# Patient Record
Sex: MALE | Race: WHITE
[De-identification: names, ages, dates, MRNs, and addresses within clinical notes are randomized per-mention and may not be internally consistent; named-entity substitution may affect disease eponyms.]

---

## 2017-01-23 ENCOUNTER — HOSPITAL ENCOUNTER (OUTPATIENT)
Dept: HOSPITAL 21 - SOUO | Age: 37
Discharge: HOME | End: 2017-01-23
Attending: INTERNAL MEDICINE
Payer: COMMERCIAL

## 2017-01-23 VITALS
RESPIRATION RATE: 16 BRPM | DIASTOLIC BLOOD PRESSURE: 77 MMHG | HEART RATE: 61 BPM | OXYGEN SATURATION: 98 % | SYSTOLIC BLOOD PRESSURE: 105 MMHG

## 2017-01-23 VITALS — DIASTOLIC BLOOD PRESSURE: 69 MMHG | OXYGEN SATURATION: 97 % | SYSTOLIC BLOOD PRESSURE: 114 MMHG

## 2017-01-23 VITALS
RESPIRATION RATE: 24 BRPM | DIASTOLIC BLOOD PRESSURE: 80 MMHG | SYSTOLIC BLOOD PRESSURE: 106 MMHG | OXYGEN SATURATION: 100 % | HEART RATE: 64 BPM

## 2017-01-23 VITALS — DIASTOLIC BLOOD PRESSURE: 73 MMHG | SYSTOLIC BLOOD PRESSURE: 110 MMHG

## 2017-01-23 VITALS — WEIGHT: 138.89 LBS | BODY MASS INDEX: 21.8 KG/M2 | HEIGHT: 67 IN

## 2017-01-23 VITALS
OXYGEN SATURATION: 99 % | RESPIRATION RATE: 17 BRPM | DIASTOLIC BLOOD PRESSURE: 83 MMHG | HEART RATE: 66 BPM | SYSTOLIC BLOOD PRESSURE: 118 MMHG

## 2017-01-23 VITALS — SYSTOLIC BLOOD PRESSURE: 106 MMHG | DIASTOLIC BLOOD PRESSURE: 80 MMHG

## 2017-01-23 DIAGNOSIS — R55: Primary | ICD-10-CM

## 2017-01-23 LAB
BASOPHILS % (AUTO): 0.9 % (ref 0–3)
EOSINOPHILS % (AUTO): 2.4 % (ref 0–5)
INR: 0.95 RATIO
MCH RBC QN AUTO: 33.1 PG (ref 27–35)
MEAN CORPUSCULAR VOLUME: 94 FL (ref 81–100)
MONOCYTES % (AUTO): 9.9 % (ref 4–12)
NEUTROPHILS NFR BLD AUTO: 49.1 % (ref 40–74)
PLATELET COUNT: 203 BIL/L (ref 150–400)

## 2017-01-23 PROCEDURE — 85610 PROTHROMBIN TIME: CPT

## 2017-01-23 PROCEDURE — 33282: CPT

## 2017-01-23 PROCEDURE — 36415 COLL VENOUS BLD VENIPUNCTURE: CPT

## 2017-01-23 PROCEDURE — 85025 COMPLETE CBC W/AUTO DIFF WBC: CPT

## 2017-01-23 PROCEDURE — 93005 ELECTROCARDIOGRAM TRACING: CPT

## 2017-01-23 PROCEDURE — 80048 BASIC METABOLIC PNL TOTAL CA: CPT

## 2017-01-23 NOTE — OP
[address] [address]

 

                                OPERATIVE REPORT

 

PATIENT:  EDUIN CIFUENTES                                      : 1980

ACCOUNT#: [id number]                                            MR#: [id number]

ADMIT:    2017

JOB ID:   [id number]

 

 

DATE OF SURGERY:    2017

 

SURGEON:    Renay Marsh MD.

 

PREOPERATIVE DIAGNOSIS(ES):   Recurrent syncope.

 

POSTOPERATIVE DIAGNOSIS(ES):   Recurrent syncope.

 

PATIENT PROFILE:    The patient is a 36-year-old male who has recurrent syncope.

 

PROCEDURE:   Injectable loop recorder implantation.

 

COMPLICATION:   None.

 

IMPLANTED DEVICE:   Ze[employer] Reveal LINQ, model LNQ11, serial #[id number].

 

DESCRIPTION OF PROCEDURE:

The left parasternal area was prepped and draped under standard sterile

technique.  The left parasternal region was infiltrated with a 50/50% mixture

of lidocaine and Marcaine.  The skin incision was made with the provided

scalpel.  An injectable loop recorder was then implanted into the subcutaneous

tissue under the provided applicator.  After hemostasis was achieved, the skin

was approximated with Dermabond.  The patient tolerated the procedure well and

was transferred to his room in stable condition.  Vancomycin 1 g was given IV

due to concern of a small area of sterile field.

 

The device was set to detect bradycardia below 30 beats per minute, tachycardia

above 194 beats per minute, and pause over 3 seconds.  The sensitivity

threshold is 0.035 mV.

 

 

MTDD

## 2017-01-23 NOTE — NUR
ADMISSION NOTE

MALE PT ADMITTED FOR LOOP RECORDER. DISCUSSED PLAN OF CARE WITH PT. SEE ADMIT AND FLOW SHEET

## 2017-03-03 ENCOUNTER — HOSPITAL ENCOUNTER (OUTPATIENT)
Dept: HOSPITAL 21 - SOUO | Age: 37
LOS: 1 days | Discharge: HOME | End: 2017-03-04
Payer: COMMERCIAL

## 2017-03-03 VITALS
SYSTOLIC BLOOD PRESSURE: 128 MMHG | DIASTOLIC BLOOD PRESSURE: 78 MMHG | RESPIRATION RATE: 18 BRPM | OXYGEN SATURATION: 98 % | HEART RATE: 61 BPM

## 2017-03-03 VITALS — HEART RATE: 68 BPM | RESPIRATION RATE: 17 BRPM | SYSTOLIC BLOOD PRESSURE: 101 MMHG | DIASTOLIC BLOOD PRESSURE: 62 MMHG

## 2017-03-03 VITALS
SYSTOLIC BLOOD PRESSURE: 109 MMHG | DIASTOLIC BLOOD PRESSURE: 62 MMHG | RESPIRATION RATE: 13 BRPM | OXYGEN SATURATION: 96 % | HEART RATE: 79 BPM

## 2017-03-03 VITALS — DIASTOLIC BLOOD PRESSURE: 74 MMHG | RESPIRATION RATE: 19 BRPM | HEART RATE: 67 BPM | SYSTOLIC BLOOD PRESSURE: 126 MMHG

## 2017-03-03 VITALS
DIASTOLIC BLOOD PRESSURE: 66 MMHG | RESPIRATION RATE: 16 BRPM | HEART RATE: 71 BPM | OXYGEN SATURATION: 95 % | SYSTOLIC BLOOD PRESSURE: 109 MMHG

## 2017-03-03 VITALS
HEART RATE: 72 BPM | OXYGEN SATURATION: 95 % | RESPIRATION RATE: 14 BRPM | DIASTOLIC BLOOD PRESSURE: 71 MMHG | SYSTOLIC BLOOD PRESSURE: 109 MMHG

## 2017-03-03 VITALS — DIASTOLIC BLOOD PRESSURE: 65 MMHG | RESPIRATION RATE: 19 BRPM | HEART RATE: 67 BPM | SYSTOLIC BLOOD PRESSURE: 109 MMHG

## 2017-03-03 VITALS
RESPIRATION RATE: 16 BRPM | SYSTOLIC BLOOD PRESSURE: 110 MMHG | OXYGEN SATURATION: 95 % | HEART RATE: 70 BPM | DIASTOLIC BLOOD PRESSURE: 67 MMHG

## 2017-03-03 VITALS — BODY MASS INDEX: 21.66 KG/M2 | WEIGHT: 138.01 LBS | HEIGHT: 67 IN

## 2017-03-03 VITALS
RESPIRATION RATE: 18 BRPM | OXYGEN SATURATION: 96 % | DIASTOLIC BLOOD PRESSURE: 61 MMHG | SYSTOLIC BLOOD PRESSURE: 112 MMHG | HEART RATE: 77 BPM

## 2017-03-03 VITALS
SYSTOLIC BLOOD PRESSURE: 95 MMHG | HEART RATE: 70 BPM | RESPIRATION RATE: 17 BRPM | OXYGEN SATURATION: 96 % | DIASTOLIC BLOOD PRESSURE: 64 MMHG

## 2017-03-03 VITALS
DIASTOLIC BLOOD PRESSURE: 61 MMHG | SYSTOLIC BLOOD PRESSURE: 104 MMHG | HEART RATE: 72 BPM | OXYGEN SATURATION: 96 % | RESPIRATION RATE: 12 BRPM

## 2017-03-03 VITALS — HEART RATE: 80 BPM | SYSTOLIC BLOOD PRESSURE: 117 MMHG | DIASTOLIC BLOOD PRESSURE: 65 MMHG | RESPIRATION RATE: 20 BRPM

## 2017-03-03 VITALS — HEART RATE: 76 BPM

## 2017-03-03 DIAGNOSIS — I45.5: ICD-10-CM

## 2017-03-03 DIAGNOSIS — R55: ICD-10-CM

## 2017-03-03 DIAGNOSIS — I49.5: Primary | ICD-10-CM

## 2017-03-03 LAB
BASOPHILS % (AUTO): 0.6 % (ref 0–3)
EOSINOPHILS % (AUTO): 1.8 % (ref 0–5)
INR: 1 RATIO
MCH RBC QN AUTO: 32.4 PG (ref 27–35)
MEAN CORPUSCULAR VOLUME: 93 FL (ref 81–100)
MONOCYTES % (AUTO): 10.5 % (ref 4–12)
NEUTROPHILS NFR BLD AUTO: 57.9 % (ref 40–74)
PLATELET COUNT: 229 BIL/L (ref 150–400)

## 2017-03-03 PROCEDURE — 93005 ELECTROCARDIOGRAM TRACING: CPT

## 2017-03-03 PROCEDURE — 80048 BASIC METABOLIC PNL TOTAL CA: CPT

## 2017-03-03 PROCEDURE — 33208 INSRT HEART PM ATRIAL & VENT: CPT

## 2017-03-03 PROCEDURE — 36415 COLL VENOUS BLD VENIPUNCTURE: CPT

## 2017-03-03 PROCEDURE — 71010: CPT

## 2017-03-03 PROCEDURE — 85025 COMPLETE CBC W/AUTO DIFF WBC: CPT

## 2017-03-03 PROCEDURE — 33284: CPT

## 2017-03-03 PROCEDURE — 71020: CPT

## 2017-03-03 PROCEDURE — 85610 PROTHROMBIN TIME: CPT

## 2017-03-03 PROCEDURE — 99152 MOD SED SAME PHYS/QHP 5/>YRS: CPT
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
## 2017-03-03 NOTE — DRSVH
PROCEDURE:  X-RAY CHEST ONE VIEW, PORTABLE ([id number])

 

INDICATIONS:  For new leads placed

 

TECHNIQUE:  One view of the chest was acquired.  

 

COMPARISON:  None.

 

FINDINGS:  

 

Surgical changes and devices: Dual chamber left cardiac pacer present in expected position.

 

Lungs and pleura:  No pleural effusions or pneumothorax.  Lungs are clear.  

 

Mediastinum:  Mediastinal contours appear normal.  Heart size is normal.  

 

Bones and chest wall:  No suspicious bony lesions.  Overlying soft tissues appear unremarkable.  

 

IMPRESSION:  No immediate complications status post cardiac pacemaker placement.

 

 

 

Dictated by: Bhanu VALENTIN Interpreted: Ryann Tuttle MD on 3/03/2017 at 13:32   

Transcribed by: RENETTA on 3/03/2017 at 13:32    

Approved by: Ryann Tuttle M.D. on 3/03/2017 at 14:03

## 2017-03-03 NOTE — NUR
Report called to [facility] Left pacer site without hematoma, bleeding or oozing. Pt ready 
for transport, remains in sinus rhythm.
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
## 2017-03-04 VITALS
HEART RATE: 74 BPM | OXYGEN SATURATION: 96 % | RESPIRATION RATE: 18 BRPM | SYSTOLIC BLOOD PRESSURE: 207 MMHG | DIASTOLIC BLOOD PRESSURE: 78 MMHG

## 2017-03-04 VITALS
DIASTOLIC BLOOD PRESSURE: 68 MMHG | OXYGEN SATURATION: 97 % | RESPIRATION RATE: 18 BRPM | HEART RATE: 70 BPM | SYSTOLIC BLOOD PRESSURE: 118 MMHG

## 2017-03-04 VITALS
OXYGEN SATURATION: 97 % | DIASTOLIC BLOOD PRESSURE: 58 MMHG | SYSTOLIC BLOOD PRESSURE: 103 MMHG | HEART RATE: 68 BPM | RESPIRATION RATE: 18 BRPM

## 2017-03-04 VITALS — HEART RATE: 65 BPM

## 2017-03-04 VITALS
SYSTOLIC BLOOD PRESSURE: 122 MMHG | RESPIRATION RATE: 18 BRPM | OXYGEN SATURATION: 96 % | HEART RATE: 93 BPM | DIASTOLIC BLOOD PRESSURE: 67 MMHG

## 2017-03-04 VITALS — HEART RATE: 72 BPM

## 2017-03-04 NOTE — DRSVH
PROCEDURE:  X-RAY CHEST, TWO VIEWS ([id number])

 

INDICATIONS:  For new lead placement

 

TECHNIQUE:  2 views of the chest were acquired.  

 

COMPARISON:  [facility], CR, XR CHEST 1VW (PORTABLE), 3/03/2017, 11:40.

 

FINDINGS:  

 

Surgical changes and devices: Left-sided pacer.

 

Lungs and pleura:  No pleural effusions or pneumothorax.  Lungs are clear.  

 

Mediastinum:  Mediastinal contours are normal.  Heart size is normal.  

 

Bones and chest wall:  No suspicious bony abnormalities.  Soft tissues appear unremarkable.  

 

IMPRESSION: No acute process.

 

 

 

Dictated by: Stanislaw Abad M.D. on 3/04/2017 at 8:31     

Approved by: Stanislaw Abad M.D. on 3/04/2017 at 8:32

## 2017-03-04 NOTE — NUR
Discharge:



Patient discharged to home @ approx 1100.  IV x2 d/c'd intact, telemetry removed, monitor 
tech notified.  Personal belongings sent home w/patient.  Reviewed antibiotic prescription, 
home medication list, d/c instructions, activity restriction r/t pacemaker placement and 
follow up appointments.  Verbalized understanding.  Ambulated to main entrance.  No apparent 
distress noted at time of discharge.

## 2017-03-04 NOTE — NUR
Uneventful Night:

Pt rested throughout the night with no complaints of chest pain or discomfort. Denies SOB, 
n/v. Up walking around unit at beginning of shift. Wife at bedside. Call light within reach. 
Pleasant and cooperative with care.

## 2017-03-04 NOTE — DIS
[address] [address]

 

                                DISCHARGE SUMMARY

 

PATIENT:  EDUIN CIFUENTES                                      : 1980

ACCOUNT#: [id number]                                            MR#: [id number]

ADMIT:    2017                                          JOB ID:   [id number]

DIS:

 

 

REASON FOR ADMISSION:

Pacemaker implant.

 

CHIEF COMPLAINT:

Recurrent syncope.

 

BRIEF HISTORY:

The patient is a pleasant 36-year-old man with a history of recurrent syncope

on about five occasions now over the past two years.  A couple of months ago he

had an ECG memory loop recorder implanted and then on 2017, he had an

episode of jaja syncope, which correlated to a profound sinus arrest of

approximately 28 seconds.  He was advised of his need for a cardiac pacemaker

to prevent recurrent syncope and was admitted for that purpose.

 

COURSE IN HOSPITAL:

The patient was admitted through the [facility] and taken to the catheterization

laboratory, where he received the dual-chamber pacemaker system without

incident.  The ECG loop recorder was explanted.  He was then taken back to the

[facility] for recovery from sedation and then brought up to the third floor and 

for overnight observation.  He did well overnight and his cardiac telemetry

showed normal sinus rhythm.  He was ambulatory without difficulty and felt well

for discharge home.  He denied much site discomfort and had no chest pain,

shortness of breath, or lightheadedness.  Chest x-ray showed good lead

positions and no pneumothorax.  Device evaluation showed excellent capture and

sensing thresholds and only < 1% pacing.  The pacemaker site is closed and dry.

There is no hematoma.

 

DISPOSITION:

The patient was discharged home in good condition with a followup appointment

at the [facility] office in one week.  He was asked not to lift, push, or

pull more than 10 pounds for one month and also to not extend his left elbow

above his shoulder for one month.  He will follow a regular diet.

 

DISCHARGE MEDICATIONS:

1.     Doxycycline 100 mg daily for 1 week.

2.     Lamisil 250 mg tablets p.r.n.

 

FINAL DIAGNOSES:

1.     Recurrent syncope.

2.     Documented sinus arrest of 28 seconds.

## 2017-03-04 NOTE — PCM.DIMED
Discharge Instructions


Date of Service


Mar 4, 2017


Dates of Hospitalization








Discharge Diagnosis


Discharge Diagnosis


Syncope


  Sinus Arrest





Diet


No restrictions





Activity


Other (Do not extend left arm high above left shoulder for one month.  Do not 

lift more that 10 lbs with the left arm for one month.)





Call your provider


Fever or Chills, Bleeding, Excessive diarrhea





Patient Instructions


Follow-up in:  1 week


Mid-level Provider (F9):  Tian Constantino PA-C


Follow-up with Mid-level in:  6 weeks








Tian Constantino PA-C Mar 4, 2017 10:40

## 2017-03-04 NOTE — PCM.DIMED
Discharge Instructions


Date of Service


Mar 4, 2017


Dates of Hospitalization


Mar 3, 2017





Discharge Diagnosis


Discharge Diagnosis


Asystole


  Syncope





Diet


No restrictions





Activity


Other (Do not extend left elbow high above shoulder for one month.  Do not lift

, push or pull more than 10 lbs with the left arm for one month.)





Call your provider


Fever or Chills, Bleeding, Excessive diarrhea





Patient Instructions


Follow-up in:  1 week


Mid-level Provider (F9):  Tian Constantino PA-C


Follow-up with Mid-level in:  6 weeks








Tian Constantino PA-C Mar 4, 2017 09:13

## 2018-11-22 ENCOUNTER — HOSPITAL ENCOUNTER (OUTPATIENT)
Dept: HOSPITAL 76 - EMS | Age: 38
Discharge: ANOTHER HOSPITAL | End: 2018-11-22
Attending: SURGERY
Payer: MEDICAID

## 2018-11-22 DIAGNOSIS — R56.9: Primary | ICD-10-CM
